# Patient Record
Sex: FEMALE | URBAN - METROPOLITAN AREA
[De-identification: names, ages, dates, MRNs, and addresses within clinical notes are randomized per-mention and may not be internally consistent; named-entity substitution may affect disease eponyms.]

---

## 2023-10-18 ENCOUNTER — APPOINTMENT (RX ONLY)
Dept: URBAN - METROPOLITAN AREA CLINIC 236 | Facility: CLINIC | Age: 53
Setting detail: DERMATOLOGY
End: 2023-10-18

## 2023-10-18 DIAGNOSIS — Z41.9 ENCOUNTER FOR PROCEDURE FOR PURPOSES OTHER THAN REMEDYING HEALTH STATE, UNSPECIFIED: ICD-10-CM

## 2023-10-18 PROCEDURE — ? JUVEDERM ULTRA INJECTION

## 2023-10-18 PROCEDURE — ? DYSPORT

## 2023-10-18 NOTE — PROCEDURE: JUVEDERM ULTRA INJECTION
Price (Use Numbers Only, No Special Characters Or $): 026 Price (Use Numbers Only, No Special Characters Or $): 991

## 2023-10-18 NOTE — PROCEDURE: DYSPORT
Price (Use Numbers Only, No Special Characters Or $): 687 Price (Use Numbers Only, No Special Characters Or $): 551

## 2023-10-18 NOTE — PROCEDURE: DYSPORT
Lot #: [id number] Lot #: [id number] [___ Brothers] : [unfilled] brothers [___ Sisters] : [unfilled] sisters [Grade:  _____] : Grade: [unfilled]

## 2023-11-08 ENCOUNTER — APPOINTMENT (RX ONLY)
Dept: URBAN - METROPOLITAN AREA CLINIC 236 | Facility: CLINIC | Age: 53
Setting detail: DERMATOLOGY
End: 2023-11-08

## 2023-11-08 DIAGNOSIS — Z41.9 ENCOUNTER FOR PROCEDURE FOR PURPOSES OTHER THAN REMEDYING HEALTH STATE, UNSPECIFIED: ICD-10-CM

## 2023-11-08 PROCEDURE — ? RESTYLANE LYFT INJECTION

## 2023-11-08 NOTE — PROCEDURE: RESTYLANE LYFT INJECTION
Post-Care Instructions: Patient instructed to apply ice to reduce swelling.
Dorsal Hands Filler Volume In Cc: 0
Price (Use Numbers Only, No Special Characters Or $): 179
Additional Area 1 Location: chin
Use Map Statement For Sites (Optional): Yes
Include Cannula Length?: 1.5 inch
Number Of Syringes (Required For Inventory): 1
Lot #: [id number]
Include Cannula Brand?: DermaSculpt
Topical Anesthesia?: LMX
Detail Level: Detailed
Consent: Written consent obtained. Risks include but not limited to bruising, beading, irregular texture, ulceration, infection, allergic reaction, scar formation, incomplete augmentation, temporary nature, procedural pain.
Procedural Text: The filler was administered to the treatment areas noted above.
Map Statement: See Attach Map for Complete Details
Include Cannula Size?: 25G
Expiration Date (Month Year): 2025-07-31
Filler: Cee Sparrow

## 2024-04-10 ENCOUNTER — APPOINTMENT (RX ONLY)
Dept: URBAN - METROPOLITAN AREA CLINIC 236 | Facility: CLINIC | Age: 54
Setting detail: DERMATOLOGY
End: 2024-04-10

## 2024-04-10 DIAGNOSIS — Z41.9 ENCOUNTER FOR PROCEDURE FOR PURPOSES OTHER THAN REMEDYING HEALTH STATE, UNSPECIFIED: ICD-10-CM

## 2024-04-10 PROCEDURE — ? DYSPORT

## 2024-04-24 ENCOUNTER — APPOINTMENT (RX ONLY)
Dept: URBAN - METROPOLITAN AREA CLINIC 236 | Facility: CLINIC | Age: 54
Setting detail: DERMATOLOGY
End: 2024-04-24

## 2024-04-24 DIAGNOSIS — Z41.9 ENCOUNTER FOR PROCEDURE FOR PURPOSES OTHER THAN REMEDYING HEALTH STATE, UNSPECIFIED: ICD-10-CM

## 2024-04-24 PROCEDURE — ? DYSPORT

## 2024-04-24 NOTE — PROCEDURE: DYSPORT
R Brow Units: 0
Show Additional Area 1: Yes
Expiration Date (Month Year): 9/30/25
Detail Level: Detailed
Price (Use Numbers Only, No Special Characters Or $): 48
Show Right And Left Brow Units: No
Additional Area 1 Location: [facility]
Consent: Written consent obtained. Risks include but not limited to lid/brow ptosis, bruising, swelling, diplopia, temporary effect, incomplete chemical denervation.
Additional Area 2 Location: obicularis oris
Post-Care Instructions: Patient instructed to not lie down for 4 hours and limit physical activity for 24 hours.
Additional Area 3 Location: nasalis
Lot #: [id number]
Dilution (U/0.1 Cc): 2.5
Forehead Units: 4
Additional Area 4 Location: masseters